# Patient Record
Sex: MALE | Race: WHITE | NOT HISPANIC OR LATINO | Employment: FULL TIME | URBAN - METROPOLITAN AREA
[De-identification: names, ages, dates, MRNs, and addresses within clinical notes are randomized per-mention and may not be internally consistent; named-entity substitution may affect disease eponyms.]

---

## 2017-08-22 ENCOUNTER — OFFICE VISIT (OUTPATIENT)
Dept: FAMILY MEDICINE | Facility: CLINIC | Age: 27
End: 2017-08-22
Payer: COMMERCIAL

## 2017-08-22 VITALS
DIASTOLIC BLOOD PRESSURE: 86 MMHG | SYSTOLIC BLOOD PRESSURE: 130 MMHG | RESPIRATION RATE: 16 BRPM | TEMPERATURE: 97.1 F | BODY MASS INDEX: 34.72 KG/M2 | WEIGHT: 248 LBS | HEIGHT: 71 IN | HEART RATE: 70 BPM | OXYGEN SATURATION: 99 %

## 2017-08-22 DIAGNOSIS — Z00.00 ROUTINE GENERAL MEDICAL EXAMINATION AT A HEALTH CARE FACILITY: Primary | ICD-10-CM

## 2017-08-22 DIAGNOSIS — Z23 NEED FOR VACCINATION: ICD-10-CM

## 2017-08-22 LAB
CHOLEST SERPL-MCNC: 182 MG/DL
HDLC SERPL-MCNC: 50 MG/DL
LDLC SERPL CALC-MCNC: 105 MG/DL
NONHDLC SERPL-MCNC: 132 MG/DL
TRIGL SERPL-MCNC: 134 MG/DL

## 2017-08-22 PROCEDURE — 36415 COLL VENOUS BLD VENIPUNCTURE: CPT | Performed by: OBSTETRICS & GYNECOLOGY

## 2017-08-22 PROCEDURE — 80061 LIPID PANEL: CPT | Performed by: OBSTETRICS & GYNECOLOGY

## 2017-08-22 PROCEDURE — 90471 IMMUNIZATION ADMIN: CPT | Performed by: OBSTETRICS & GYNECOLOGY

## 2017-08-22 PROCEDURE — 99395 PREV VISIT EST AGE 18-39: CPT | Mod: 25 | Performed by: OBSTETRICS & GYNECOLOGY

## 2017-08-22 PROCEDURE — 90715 TDAP VACCINE 7 YRS/> IM: CPT | Performed by: OBSTETRICS & GYNECOLOGY

## 2017-08-22 ASSESSMENT — PAIN SCALES - GENERAL: PAINLEVEL: NO PAIN (0)

## 2017-08-22 NOTE — MR AVS SNAPSHOT
"              After Visit Summary   2017    Kelechi Winkler    MRN: [id number]           Patient Information     Date Of Birth          1990        Visit Information        Provider Department      2017 7:50 AM Kodak Martinez MD [facility]        Today's Diagnoses     Routine general medical examination at a health care facility    -  1    Need for vaccination           Follow-ups after your visit        Who to contact     If you have questions or need follow up information about today's clinic visit or your schedule please contact [facility] directly at [phone].  Normal or non-critical lab and imaging results will be communicated to you by [employer], letter or phone within 4 business days after the clinic has received the results. If you do not hear from us within 7 days, please contact the clinic through [employer] or phone. If you have a critical or abnormal lab result, we will notify you by phone as soon as possible.  Submit refill requests through [employer] or call your pharmacy and they will forward the refill request to us. Please allow 3 business days for your refill to be completed.          Additional Information About Your Visit        MyChart Information     [employer] lets you send messages to your doctor, view your test results, renew your prescriptions, schedule appointments and more. To sign up, go to [email or web address] . Click on \"Log in\" on the left side of the screen, which will take you to the Welcome page. Then click on \"Sign up Now\" on the right side of the page.     You will be asked to enter the access code listed below, as well as some personal information. Please follow the directions to create your username and password.     Your access code is: [email or web address]  Expires: 2017  8:17 AM     Your access code will  in 90 days. If you need help or a new code, please call your [facility] or [phone].        Care " "EveryWhere ID     This is your Care EveryWhere ID. This could be used by other organizations to access your [place] medical records  [id number]        Your Vitals Were     Pulse Temperature Respirations Height Pulse Oximetry BMI (Body Mass Index)    70 97.1  F (36.2  C) (Tympanic) 16 5' 11\" (1.803 m) 99% 34.59 kg/m2       Blood Pressure from Last 3 Encounters:   08/22/17 130/86   09/08/16 114/60   04/14/10 110/70    Weight from Last 3 Encounters:   08/22/17 248 lb (112.5 kg)   09/08/16 238 lb 8 oz (108.2 kg)   04/14/10 203 lb 12.8 oz (92.4 kg)              We Performed the Following     1st  Administration  [77521]     Lipid panel reflex to direct LDL     TDAP VACCINE (ADACEL) [85568.002]        Primary Care Provider Office Phone # Fax #    Carl Lawrence PA-C [phone]       [address] [place] [address]        Equal Access to Services     [facility]: Hadii aad ku hadasho Soomaali, waaxda luqadaha, qaybta kaalmada adeegyada, taiwo vargas . So [facility] [phone].    ATENCIÓN: Si habla español, tiene a johnston disposición servicios gratuitos de asistencia lingüística. Llame al [phone].    We comply with applicable federal civil rights laws and [place] laws. We do not discriminate on the basis of race, color, national origin, age, disability sex, sexual orientation or gender identity.            Thank you!     Thank you for choosing [facility]  for your care. Our goal is always to provide you with excellent care. Hearing back from our patients is one way we can continue to improve our services. Please take a few minutes to complete the written survey that you may receive in the mail after your visit with us. Thank you!             Your Updated Medication List - Protect others around you: Learn how to safely use, store and throw away your medicines at www.disposemymeds.org.      Notice  As of 8/22/2017  8:17 AM    You have not been prescribed any " medications.

## 2017-08-22 NOTE — LETTER
Kelechi Winkler  [address] [place] [id number]        August 22, 2017          Dear ,    We are writing to inform you of your test results.    Your test results fall within the expected range(s).    Resulted Orders   Lipid panel reflex to direct LDL   Result Value Ref Range    Cholesterol 182 <200 mg/dL    Triglycerides 134 <150 mg/dL    HDL Cholesterol 50 >39 mg/dL    LDL Cholesterol Calculated 105 (H) <100 mg/dL      Comment:      Above desirable:  100-129 mg/dl  Borderline High:  130-159 mg/dL  High:             160-189 mg/dL  Very high:       >189 mg/dl      Non HDL Cholesterol 132 (H) <130 mg/dL      Comment:      Above Desirable:  130-159 mg/dl  Borderline high:  160-189 mg/dl  High:             190-219 mg/dl  Very high:       >219 mg/dl         If you have any questions or concerns, please call the clinic at the number listed above.       Sincerely,        Kodak Martinez MD

## 2017-08-22 NOTE — NURSING NOTE
"Chief Complaint   Patient presents with     Physical       Initial /86 (BP Location: Right arm, Patient Position: Chair, Cuff Size: Adult Large)  Pulse 70  Temp 97.1  F (36.2  C) (Tympanic)  Resp 16  Ht 5' 11\" (1.803 m)  Wt 248 lb (112.5 kg)  SpO2 99%  BMI 34.59 kg/m2 Estimated body mass index is 34.59 kg/(m^2) as calculated from the following:    Height as of this encounter: 5' 11\" (1.803 m).    Weight as of this encounter: 248 lb (112.5 kg)..   BP completed using cuff size: large  Medication Rec Completed    Johnna Kaplan CMA  Prior to injection verified patient identity using patient's name and date of birth.  Per orders of Dr. Martinez, injection of TDaP given by Johnna Kaplan. Patient instructed to remain in clinic for 15 minutes afterwards, and to report any adverse reaction to me immediately.    "

## 2017-08-22 NOTE — PROGRESS NOTES
Johnna Please inform Kelechi/ or caretaker  that this result(s) is/are normal.  Thanks. ANNA Martinez MD

## 2017-08-22 NOTE — PROGRESS NOTES
"Subjective:Kelechi is here for yearly physical. Current concerns are:  He has an oily scalp. Also he has had a lump in his neck on the left side for  Along time  ? 2 + Years- hasnt changed in size. nontender currently but sometimes gets tender-.    No past medical history on file.   Current Outpatient Prescriptions   Medication Sig Dispense Refill     IBUPROFEN 800 MG OR TABS 1 tab po 3 times daily with food 90 Tab 1      Allergies   Allergen Reactions     No Known Drug Allergies       History   Smoking Status     Passive Smoke Exposure - Never Smoker   Smokeless Tobacco     Not on file     Comment: second hand smoke, smokers are outside      Past Surgical History:   Procedure Laterality Date     C REPAIR CRUCIATE LIGAMENT,KNEE  10/19/2006    Bone grafting tibia & proximal tibia patella,  AC reconstruction.  [facility]      Social History   Substance Use Topics     Smoking status: Passive Smoke Exposure - Never Smoker     Smokeless tobacco: Not on file      Comment: second hand smoke, smokers are outside     Alcohol use No      Family History   Problem Relation Age of Onset     CANCER Maternal Grandfather      spine     CEREBROVASCULAR DISEASE Maternal Grandfather      Alcohol/Drug Paternal Grandmother      CANCER Paternal Grandfather      bladder       Review Of Systems  Skin: negative  Eyes: negative  Ears/Nose/Throat: negative  Respiratory: No shortness of breath, dyspnea on exertion, cough, or hemoptysis  Cardiovascular: negative  Gastrointestinal: negative  Genitourinary: negative  Musculoskeletal: negative  Neurologic: negative  Psychiatric: negative  Hematologic/Lymphatic/Immunologic: negative  Endocrine: negative      Physical Exam: vital signs: Blood pressure 130/86, pulse 70, temperature 97.1  F (36.2  C), temperature source Tympanic, resp. rate 16, height 5' 11\" (1.803 m), weight 248 lb (112.5 kg), SpO2 99 %.        HEENT is normal,   Sclerae and conjunctiva are normal. Ear canals and TMs look " normal.  Nasal mucosa is pink and no polyps or masses seen. Throat is unremarkable . Mucous membranes are moist.    Neck is supple, mobile, no adenoapthy or masses palpable except for one small mobile 1 cm lump on the left anterior cervical LN chain which I think is probably a small LN-it is not inflamed.. Normal range of motion noted.    I checked axillary and inguinal areas and there are no LN palpable in any of these areas.    Chest is clear to auscultation. No wheezes, rales or rhonchi heard. cardiac exam is normal with s1, s2, no murmurs or adventitious sounds.Normal rate and rhythm is heard.  Abdomen is soft,  nondistended, No masses felt.No HSM. No guarding or rigidity or rebound noted. Nontender   to palpation. Normal bowel sounds heard.   Extremities have normal sensation and color.Normal pulses noted.  No cyanosis or ulcers or trophic skin changes. No edema noted.    Genital Exam: testicles in scrotum- no masses or abnormalities noted. Penis circumcised.   no penile lesions noted.   Rectal exam: deferred    Assessment:1.Normal yearly exam    2.he has oily skin on his scalp- I advised a gentle shampoo such as omari's baby shampoo/   Avoid wearing his hat so much in the hot weather  3.I advised him to report to me or any physician if that lump in his neck changes size or if he gets any more lumps anywhere- and I advised a rechekc of that lump in 3-6 months-sooner if it gets larger-    Plan:Diet and rest and exercise discussed.   See labs and orders.Immunizations reviewed and discussed-including advice for yearly flu shot/tetanus update    Immunizations given today:  TDAP           I advised the following exams with specialists:    1. Dental evaluation yearly    2. Dermatology evaluation for mole exam yearly    3. Ophthalmology exam yearly to check for glaucoma, etc          Living will discussed:        Labs done:lipids    Followup in  3 months, sooner if concerns arise.   ANNA Martinez MD(electronic  signature)

## 2018-01-29 ENCOUNTER — OFFICE VISIT (OUTPATIENT)
Dept: FAMILY MEDICINE | Facility: OTHER | Age: 28
End: 2018-01-29
Payer: COMMERCIAL

## 2018-01-29 DIAGNOSIS — L03.319 CELLULITIS AND ABSCESS OF TRUNK: Primary | ICD-10-CM

## 2018-01-29 DIAGNOSIS — L02.219 CELLULITIS AND ABSCESS OF TRUNK: Primary | ICD-10-CM

## 2018-01-29 PROCEDURE — 99213 OFFICE O/P EST LOW 20 MIN: CPT | Performed by: NURSE PRACTITIONER

## 2018-01-29 RX ORDER — CEPHALEXIN 500 MG/1
500 CAPSULE ORAL 3 TIMES DAILY
Qty: 30 CAPSULE | Refills: 0 | Status: SHIPPED | OUTPATIENT
Start: 2018-01-29 | End: 2018-04-04

## 2018-01-29 NOTE — PROGRESS NOTES
SUBJECTIVE:   Kelechi Winkler is a 27 year old male who presents to clinic today for the following health issues:      POSSIBLE CYST  -right side lower abdomen  -x1-2 months  -no pain    Waxes and wanes.  Drains at times, but never completely goes away.  No fevers/chills.     Problem list and histories reviewed & adjusted, as indicated.  Additional history: none    Patient Active Problem List   Diagnosis     Internal derangement of knee     Injury, other and unspecified, knee, leg, ankle, and foot     Shoulder sprain     Past Surgical History:   Procedure Laterality Date     C REPAIR CRUCIATE LIGAMENT,KNEE  10/19/2006    Bone grafting tibia & proximal tibia patella,  AC reconstruction.  [facility]       Social History   Substance Use Topics     Smoking status: Passive Smoke Exposure - Never Smoker     Smokeless tobacco: Never Used      Comment: second hand smoke, smokers are outside     Alcohol use Yes      Comment: occasional     Family History   Problem Relation Age of Onset     CANCER Maternal Grandfather      spine     CEREBROVASCULAR DISEASE Maternal Grandfather      Alcohol/Drug Paternal Grandmother      CANCER Paternal Grandfather      bladder         Current Outpatient Prescriptions   Medication Sig Dispense Refill     cephALEXin (KEFLEX) 500 MG capsule Take 1 capsule (500 mg) by mouth 3 times daily 30 capsule 0     Allergies   Allergen Reactions     No Known Drug Allergies      BP Readings from Last 3 Encounters:   08/22/17 130/86   09/08/16 114/60   04/14/10 110/70    Wt Readings from Last 3 Encounters:   08/22/17 248 lb (112.5 kg)   09/08/16 238 lb 8 oz (108.2 kg)   04/14/10 203 lb 12.8 oz (92.4 kg)                    Reviewed and updated as needed this visit by clinical staff  Tobacco  Allergies  Meds  Soc Hx      Reviewed and updated as needed this visit by Provider         ROS:  Constitutional, HEENT, cardiovascular, pulmonary, gi and gu systems are negative, except as otherwise  noted.    OBJECTIVE:     There were no vitals taken for this visit.  There is no height or weight on file to calculate BMI.  GENERAL: healthy, alert and no distress  SKIN: right lower abdomen has raised, erythematous mass.  It is tender to palpation and with very little palpation, starts to drain purulent material.  There is what feels like scar tissue surrounding this.  Surrounding tissue erythema and warmth.     Diagnostic Test Results:  none     ASSESSMENT/PLAN:         1. Cellulitis and abscess of trunk  This is already draining, has surrounding tissue cellulitis, so will treat with antibiotics as prescribed. There is surrounding scar tissue verses cyst capsule, so will have him see general surgeon for possible removal.    - GENERAL SURG ADULT REFERRAL  - cephALEXin (KEFLEX) 500 MG capsule; Take 1 capsule (500 mg) by mouth 3 times daily  Dispense: 30 capsule; Refill: 0    See Patient Instructions    ALEXSANDRA Anne [facility]

## 2018-01-29 NOTE — PATIENT INSTRUCTIONS
Take Keflex as prescribed 3 times a day for 10 days.     See the general surgeon in [place] about getting this removed.

## 2018-01-29 NOTE — NURSING NOTE
"Chief Complaint   Patient presents with     Cyst     lower right abdomen       Initial There were no vitals taken for this visit. Estimated body mass index is 34.59 kg/(m^2) as calculated from the following:    Height as of 8/22/17: 5' 11\" (1.803 m).    Weight as of 8/22/17: 248 lb (112.5 kg).  Medication Reconciliation: complete   ................Thiago Lange LPN,   January 29, 2018,      3:48 PM,   [facility]    "

## 2018-01-29 NOTE — MR AVS SNAPSHOT
After Visit Summary   1/29/2018    Kelechi Winkler    MRN: [id number]           Patient Information     Date Of Birth          1990        Visit Information        Provider Department      1/29/2018 4:00 PM Rebeca Arambula APRN CNP [facility]        Today's Diagnoses     Cellulitis and abscess of trunk    -  1      Care Instructions    Take Keflex as prescribed 3 times a day for 10 days.     See the general surgeon in [place] about getting this removed.               Follow-ups after your visit        Additional Services     GENERAL SURG ADULT REFERRAL       Your provider has referred you to: [facility]: [facility] [phone]   [email or web address]    Please be aware that coverage of these services is subject to the terms and limitations of your health insurance plan.  Call member services at your health plan with any benefit or coverage questions.      Please bring the following with you to your appointment:    (1) Any X-Rays, CTs or MRIs which have been performed.  Contact the facility where they were done to arrange for  prior to your scheduled appointment.   (2) List of current medications   (3) This referral request   (4) Any documents/labs given to you for this referral                  Your next 10 appointments already scheduled     Jan 29, 2018  4:00 PM CST   Office Visit with ALEXSANDRA Anne CNP   [facility] ([facility])    [address] [facility] [address] [phone]           Bring a current list of meds and any records pertaining to this visit. For Physicals, please bring immunization records and any forms needing to be filled out. Please arrive 10 minutes early to complete paperwork.              Who to contact     If you have questions or need follow up information about today's clinic visit or your schedule please contact [facility] directly at  "[phone].  Normal or non-critical lab and imaging results will be communicated to you by [employer], letter or phone within 4 business days after the clinic has received the results. If you do not hear from us within 7 days, please contact the clinic through [employer] or phone. If you have a critical or abnormal lab result, we will notify you by phone as soon as possible.  Submit refill requests through [employer] or call your pharmacy and they will forward the refill request to us. Please allow 3 business days for your refill to be completed.          Additional Information About Your Visit        [employer] Information     [employer] lets you send messages to your doctor, view your test results, renew your prescriptions, schedule appointments and more. To sign up, go to [email or web address] . Click on \"Log in\" on the left side of the screen, which will take you to the Welcome page. Then click on \"Sign up Now\" on the right side of the page.     You will be asked to enter the access code listed below, as well as some personal information. Please follow the directions to create your username and password.     Your access code is: [email or web address]  Expires: 2018  3:58 PM     Your access code will  in 90 days. If you need help or a new code, please call your [place] clinic or [phone].        Care EveryWhere ID     This is your Care EveryWhere ID. This could be used by other organizations to access your [place] medical records  [id number]         Blood Pressure from Last 3 Encounters:   17 130/86   16 114/60   04/14/10 110/70    Weight from Last 3 Encounters:   17 248 lb (112.5 kg)   16 238 lb 8 oz (108.2 kg)   04/14/10 203 lb 12.8 oz (92.4 kg)              We Performed the Following     GENERAL SURG ADULT REFERRAL          Today's Medication Changes          These changes are accurate as of 18  3:58 PM.  If you have any questions, ask your nurse or doctor.               Start " taking these medicines.        Dose/Directions    cephALEXin 500 MG capsule   Commonly known as:  KEFLEX   Used for:  Cellulitis and abscess of trunk   Started by:  Rebeca Arambula APRN CNP        Dose:  500 mg   Take 1 capsule (500 mg) by mouth 3 times daily   Quantity:  30 capsule   Refills:  0            Where to get your medicines      These medications were sent to [facility] - [place], [place] - [address]  [address] , [facility] [address]     Phone:  [phone]     cephALEXin 500 MG capsule                Primary Care Provider Office Phone # Fax #    Carl Lawrence PA-C [phone]       [address] [address]        Equal Access to Services     [facility] : Hadii tessa price hadasho Sosashaali, waaxda luqadaha, qaybta kaalmada adeegyada, taiwo wolf. So [facility] [phone].    ATENCIÓN: Si habla español, tiene a johnston disposición servicios gratuitos de asistencia lingüística. [facility] [phone].    We comply with applicable federal civil rights laws and [place] laws. We do not discriminate on the basis of race, color, national origin, age, disability, sex, sexual orientation, or gender identity.            Thank you!     Thank you for choosing [facility]  for your care. Our goal is always to provide you with excellent care. Hearing back from our patients is one way we can continue to improve our services. Please take a few minutes to complete the written survey that you may receive in the mail after your visit with us. Thank you!             Your Updated Medication List - Protect others around you: Learn how to safely use, store and throw away your medicines at www.disposemymeds.org.          This list is accurate as of 1/29/18  3:58 PM.  Always use your most recent med list.                   Brand Name Dispense Instructions for use Diagnosis    cephALEXin 500 MG capsule    KEFLEX    30 capsule    Take 1  capsule (500 mg) by mouth 3 times daily    Cellulitis and abscess of trunk

## 2018-04-04 ENCOUNTER — OFFICE VISIT (OUTPATIENT)
Dept: SURGERY | Facility: CLINIC | Age: 28
End: 2018-04-04
Payer: COMMERCIAL

## 2018-04-04 VITALS
DIASTOLIC BLOOD PRESSURE: 70 MMHG | BODY MASS INDEX: 34.44 KG/M2 | SYSTOLIC BLOOD PRESSURE: 104 MMHG | TEMPERATURE: 97 F | HEIGHT: 71 IN | WEIGHT: 246 LBS

## 2018-04-04 DIAGNOSIS — L72.3 INFECTED SEBACEOUS CYST OF SKIN: Primary | ICD-10-CM

## 2018-04-04 DIAGNOSIS — L08.9 INFECTED SEBACEOUS CYST OF SKIN: Primary | ICD-10-CM

## 2018-04-04 PROCEDURE — 10060 I&D ABSCESS SIMPLE/SINGLE: CPT | Performed by: SURGERY

## 2018-04-04 PROCEDURE — 99243 OFF/OP CNSLTJ NEW/EST LOW 30: CPT | Mod: 25 | Performed by: SURGERY

## 2018-04-04 RX ORDER — SULFAMETHOXAZOLE/TRIMETHOPRIM 800-160 MG
1 TABLET ORAL 2 TIMES DAILY
Qty: 14 TABLET | Refills: 0 | Status: SHIPPED | OUTPATIENT
Start: 2018-04-04 | End: 2018-05-07

## 2018-04-04 NOTE — NURSING NOTE
"Chief Complaint   Patient presents with     Consult     cyst on belt line, referred by Rebeca Arambula APRN CNP        Initial /70  Temp 97  F (36.1  C) (Temporal)  Ht 1.803 m (5' 11\")  Wt 111.6 kg (246 lb)  BMI 34.31 kg/m2 Estimated body mass index is 34.31 kg/(m^2) as calculated from the following:    Height as of this encounter: 1.803 m (5' 11\").    Weight as of this encounter: 111.6 kg (246 lb).  Medication Reconciliation: complete   Kee REESE CMA    "

## 2018-04-04 NOTE — PROGRESS NOTES
Patient seen in consultation for recurrent infected sebaceous cyst by Rebeca Arambula    HPI:  Patient is a 27 year old male with several month history of RLQ skin infection. He saw a provider in January with an infected spot that was already spontaneously draining at the time. He was given keflex and told to follow up with surgery. He didn't follow up at that time. It is now enlarging and becoming more painful again. It has not drained this time. No fevers, chills, rigors. No issues urinating or stooling.     Review Of Systems    Skin: as above  Ears/Nose/Throat: negative  Respiratory: No shortness of breath, dyspnea on exertion, cough, or hemoptysis  Cardiovascular: negative  Gastrointestinal: negative  Genitourinary: negative  Musculoskeletal: negative  Neurologic: negative  Hematologic/Lymphatic/Immunologic: negative  Endocrine: negative      No past medical history on file.    Past Surgical History:   Procedure Laterality Date     C REPAIR CRUCIATE LIGAMENT,KNEE  10/19/2006    Bone grafting tibia & proximal tibia patella,  AC reconstruction.  [facility]       Family History   Problem Relation Age of Onset     CANCER Maternal Grandfather      spine     CEREBROVASCULAR DISEASE Maternal Grandfather      Alcohol/Drug Paternal Grandmother      CANCER Paternal Grandfather      bladder       Social History     Social History     Marital status: Single     Spouse name: N/A     Number of children: N/A     Years of education: N/A     Occupational History     Not on file.     Social History Main Topics     Smoking status: Passive Smoke Exposure - Never Smoker     Smokeless tobacco: Never Used      Comment: second hand smoke, smokers are outside     Alcohol use Yes      Comment: occasional     Drug use: No     Sexual activity: Yes     Partners: Female     Other Topics Concern      Service No     Blood Transfusions No     Caffeine Concern No     Occupational Exposure No     Hobby Hazards No     Sleep Concern No  "    Stress Concern No     Weight Concern No     Special Diet No     Back Care No     Exercise Yes     Bike Helmet Yes     Seat Belt Yes     Social History Narrative       Current Outpatient Prescriptions   Medication Sig Dispense Refill     sulfamethoxazole-trimethoprim (BACTRIM DS/SEPTRA DS) 800-160 MG per tablet Take 1 tablet by mouth 2 times daily 14 tablet 0       Medications and history reviewed    Physical exam:  Vitals: /70  Temp 97  F (36.1  C) (Temporal)  Ht 1.803 m (5' 11\")  Wt 111.6 kg (246 lb)  BMI 34.31 kg/m2  BMI= Body mass index is 34.31 kg/(m^2).    Constitutional: Healthy, alert, non-distressed   Head: Normo-cephalic, atraumatic, no lesions, masses or tenderness   Cardiovascular: RRR, no new murmurs, +S1, +S2 heart sounds, no clicks, rubs or gallops   Respiratory: CTAB, no rales, rhonchi or wheezing, equal chest rise, good respiratory effort   Gastrointestinal: Soft, non-tender, non distended, no rebound rigidity or guarding, no masses or hernias palpated   : Deferred  Musculoskeletal: Moves all extremities, normal  strength, no deformities noted   Skin: RLQ macie of induration with central fluctuance. Previous site of drainage noted but not currently draining, erythema surrounds the area of induration  Psychiatric: Mentation appears normal, affect appropriate   Hematologic/Lymphatic/Immunologic: Normal cervical and supraclavicular lymph nodes   Patient able to get up on table without difficulty.    Labs show:  No results found for this or any previous visit (from the past 24 hour(s)).      Assessment:     ICD-10-CM    1. Infected sebaceous cyst of skin L72.3 sulfamethoxazole-trimethoprim (BACTRIM DS/SEPTRA DS) 800-160 MG per tablet    L08.9      Plan: I&D of the infection today with PO ABX for 7 days. He is to return in 2 weeks for local excision of cyst versus recurrent abscess cavity. See below for procedure. He was instructed to remove packing in 24 hours and keep dry gauze over " "wound until heals over.    Fernando Marcus, DO     PROCEDURE:   Written consent was obtained    The RLQ area was prepped and appropriately anesthetized with 1% lidocaine with epinephrine. Using the usual technique, incision and drainage was performed. 5ml purulence drained. 1/4\" plain packing placed. An appropriate  dressing was applied.  The procedure was well tolerated and without complications. Specimen was not sent to Pathology.        "

## 2018-04-04 NOTE — LETTER
4/4/2018         RE: Kelechi Winkler  [address] [place] [id number]        Dear Colleague,    Thank you for referring your patient, Kelechi Winkler, to the [facility]. Please see a copy of my visit note below.    Patient seen in consultation for recurrent infected sebaceous cyst by Rebeca Arambula    HPI:  Patient is a 27 year old male with several month history of RLQ skin infection. He saw a provider in January with an infected spot that was already spontaneously draining at the time. He was given keflex and told to follow up with surgery. He didn't follow up at that time. It is now enlarging and becoming more painful again. It has not drained this time. No fevers, chills, rigors. No issues urinating or stooling.     Review Of Systems    Skin: as above  Ears/Nose/Throat: negative  Respiratory: No shortness of breath, dyspnea on exertion, cough, or hemoptysis  Cardiovascular: negative  Gastrointestinal: negative  Genitourinary: negative  Musculoskeletal: negative  Neurologic: negative  Hematologic/Lymphatic/Immunologic: negative  Endocrine: negative      No past medical history on file.    Past Surgical History:   Procedure Laterality Date     C REPAIR CRUCIATE LIGAMENT,KNEE  10/19/2006    Bone grafting tibia & proximal tibia patella,  AC reconstruction.  [facility]       Family History   Problem Relation Age of Onset     CANCER Maternal Grandfather      spine     CEREBROVASCULAR DISEASE Maternal Grandfather      Alcohol/Drug Paternal Grandmother      CANCER Paternal Grandfather      bladder       Social History     Social History     Marital status: Single     Spouse name: N/A     Number of children: N/A     Years of education: N/A     Occupational History     Not on file.     Social History Main Topics     Smoking status: Passive Smoke Exposure - Never Smoker     Smokeless tobacco: Never Used      Comment: second hand smoke, smokers are outside     Alcohol use Yes       "Comment: occasional     Drug use: No     Sexual activity: Yes     Partners: Female     Other Topics Concern      Service No     Blood Transfusions No     Caffeine Concern No     Occupational Exposure No     Hobby Hazards No     Sleep Concern No     Stress Concern No     Weight Concern No     Special Diet No     Back Care No     Exercise Yes     Bike Helmet Yes     Seat Belt Yes     Social History Narrative       Current Outpatient Prescriptions   Medication Sig Dispense Refill     sulfamethoxazole-trimethoprim (BACTRIM DS/SEPTRA DS) 800-160 MG per tablet Take 1 tablet by mouth 2 times daily 14 tablet 0       Medications and history reviewed    Physical exam:  Vitals: /70  Temp 97  F (36.1  C) (Temporal)  Ht 1.803 m (5' 11\")  Wt 111.6 kg (246 lb)  BMI 34.31 kg/m2  BMI= Body mass index is 34.31 kg/(m^2).    Constitutional: Healthy, alert, non-distressed   Head: Normo-cephalic, atraumatic, no lesions, masses or tenderness   Cardiovascular: RRR, no new murmurs, +S1, +S2 heart sounds, no clicks, rubs or gallops   Respiratory: CTAB, no rales, rhonchi or wheezing, equal chest rise, good respiratory effort   Gastrointestinal: Soft, non-tender, non distended, no rebound rigidity or guarding, no masses or hernias palpated   : Deferred  Musculoskeletal: Moves all extremities, normal  strength, no deformities noted   Skin: RLQ macie of induration with central fluctuance. Previous site of drainage noted but not currently draining, erythema surrounds the area of induration  Psychiatric: Mentation appears normal, affect appropriate   Hematologic/Lymphatic/Immunologic: Normal cervical and supraclavicular lymph nodes   Patient able to get up on table without difficulty.    Labs show:  No results found for this or any previous visit (from the past 24 hour(s)).      Assessment:     ICD-10-CM    1. Infected sebaceous cyst of skin L72.3 sulfamethoxazole-trimethoprim (BACTRIM DS/SEPTRA DS) 800-160 MG per tablet    " "L08.9      Plan: I&D of the infection today with PO ABX for 7 days. He is to return in 2 weeks for local excision of cyst versus recurrent abscess cavity. See below for procedure. He was instructed to remove packing in 24 hours and keep dry gauze over wound until heals over.    Fernando Marcus DO     PROCEDURE:   Written consent was obtained    The RLQ area was prepped and appropriately anesthetized with 1% lidocaine with epinephrine. Using the usual technique, incision and drainage was performed. 5ml purulence drained. 1/4\" plain packing placed. An appropriate  dressing was applied.  The procedure was well tolerated and without complications. Specimen was not sent to Pathology.          Again, thank you for allowing me to participate in the care of your patient.        Sincerely,        Fernando Marcus, DO    "

## 2018-05-07 ENCOUNTER — RADIANT APPOINTMENT (OUTPATIENT)
Dept: GENERAL RADIOLOGY | Facility: OTHER | Age: 28
End: 2018-05-07
Attending: PHYSICIAN ASSISTANT
Payer: COMMERCIAL

## 2018-05-07 ENCOUNTER — OFFICE VISIT (OUTPATIENT)
Dept: FAMILY MEDICINE | Facility: OTHER | Age: 28
End: 2018-05-07
Payer: OTHER MISCELLANEOUS

## 2018-05-07 VITALS
SYSTOLIC BLOOD PRESSURE: 108 MMHG | TEMPERATURE: 98.3 F | WEIGHT: 255 LBS | BODY MASS INDEX: 35.57 KG/M2 | DIASTOLIC BLOOD PRESSURE: 66 MMHG | HEART RATE: 84 BPM

## 2018-05-07 DIAGNOSIS — S93.601A FOOT SPRAIN, RIGHT, INITIAL ENCOUNTER: ICD-10-CM

## 2018-05-07 DIAGNOSIS — M79.671 RIGHT FOOT PAIN: Primary | ICD-10-CM

## 2018-05-07 DIAGNOSIS — M79.671 RIGHT FOOT PAIN: ICD-10-CM

## 2018-05-07 PROCEDURE — 73630 X-RAY EXAM OF FOOT: CPT | Mod: RT

## 2018-05-07 PROCEDURE — 99213 OFFICE O/P EST LOW 20 MIN: CPT | Performed by: PHYSICIAN ASSISTANT

## 2018-05-07 ASSESSMENT — PAIN SCALES - GENERAL: PAINLEVEL: MODERATE PAIN (4)
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
## 2018-05-07 NOTE — MR AVS SNAPSHOT
"              After Visit Summary   2018    Kelechi Winkler    MRN: [id number]           Patient Information     Date Of Birth          1990        Visit Information        Provider Department      2018 3:30 PM Laura Rick PA-C [facility]        Today's Diagnoses     Right foot pain    -  1    Foot sprain, right, initial encounter           Follow-ups after your visit        Who to contact     If you have questions or need follow up information about today's clinic visit or your schedule please contact [facility] directly at [phone].  Normal or non-critical lab and imaging results will be communicated to you by PopUpstershart, letter or phone within 4 business days after the clinic has received the results. If you do not hear from us within 7 days, please contact the clinic through PopUpstershart or phone. If you have a critical or abnormal lab result, we will notify you by phone as soon as possible.  Submit refill requests through [employer] or call your pharmacy and they will forward the refill request to us. Please allow 3 business days for your refill to be completed.          Additional Information About Your Visit        MyChart Information     [employer] lets you send messages to your doctor, view your test results, renew your prescriptions, schedule appointments and more. To sign up, go to [email or web address] . Click on \"Log in\" on the left side of the screen, which will take you to the Welcome page. Then click on \"Sign up Now\" on the right side of the page.     You will be asked to enter the access code listed below, as well as some personal information. Please follow the directions to create your username and password.     Your access code is: [email or web address]  Expires: 2018  3:43 PM     Your access code will  in 90 days. If you need help or a new code, please call your [facility] or [phone].        Care EveryWhere ID     This is your Care EveryWhere " ID. This could be used by other organizations to access your [place] medical records  [id number]        Your Vitals Were     Pulse Temperature BMI (Body Mass Index)             84 98.3  F (36.8  C) 35.57 kg/m2          Blood Pressure from Last 3 Encounters:   05/07/18 108/66   04/04/18 104/70   08/22/17 130/86    Weight from Last 3 Encounters:   05/07/18 255 lb (115.7 kg)   04/04/18 246 lb (111.6 kg)   08/22/17 248 lb (112.5 kg)               Primary Care Provider Office Phone # Fax #    Carl Lawrence PA-C [phone] [address] [facility] [address]        Equal Access to Services     TRENTON CONNELL : Cheli montero Soabrahan, waaxda luqadaha, qaybta kaalmada adetraceeyamelissa, taiwo wolf. So [facility] [phone].    ATENCIÓN: Si habla español, tiene a johnston disposición servicios gratuitos de asistencia lingüística. Llame al [phone].    We comply with applicable federal civil rights laws and [place] laws. We do not discriminate on the basis of race, color, national origin, age, disability, sex, sexual orientation, or gender identity.            Thank you!     Thank you for choosing [facility]  for your care. Our goal is always to provide you with excellent care. Hearing back from our patients is one way we can continue to improve our services. Please take a few minutes to complete the written survey that you may receive in the mail after your visit with us. Thank you!             Your Updated Medication List - Protect others around you: Learn how to safely use, store and throw away your medicines at www.disposemymeds.org.      Notice  As of 5/7/2018  3:47 PM    You have not been prescribed any medications.

## 2018-09-10 ENCOUNTER — ALLIED HEALTH/NURSE VISIT (OUTPATIENT)
Dept: FAMILY MEDICINE | Facility: CLINIC | Age: 28
End: 2018-09-10
Payer: COMMERCIAL

## 2018-09-10 DIAGNOSIS — Z23 NEED FOR PROPHYLACTIC VACCINATION AND INOCULATION AGAINST INFLUENZA: Primary | ICD-10-CM

## 2018-09-10 PROCEDURE — 90686 IIV4 VACC NO PRSV 0.5 ML IM: CPT

## 2018-09-10 PROCEDURE — 90471 IMMUNIZATION ADMIN: CPT

## 2018-09-10 NOTE — PROGRESS NOTES

## 2018-09-10 NOTE — NURSING NOTE
Prior to injection verified patient identity using patient's name and date of birth.  Due to injection administration, patient instructed to remain in clinic for 15 minutes  afterwards, and to report any adverse reaction to me immediately.    Nancy Mathews, CMA

## 2018-09-10 NOTE — MR AVS SNAPSHOT
After Visit Summary   9/10/2018    Kelechi Winkler    MRN: [id number]           Patient Information     Date Of Birth          1990        Visit Information        Provider Department      9/10/2018 4:45 PM NOREEN PITT MA [facility]        Today's Diagnoses     Need for prophylactic vaccination and inoculation against influenza    -  1       Follow-ups after your visit        Who to contact     If you have questions or need follow up information about today's clinic visit or your schedule please contact [facility] directly at [phone].  Normal or non-critical lab and imaging results will be communicated to you by MyChart, letter or phone within 4 business days after the clinic has received the results. If you do not hear from us within 7 days, please contact the clinic through MyChart or phone. If you have a critical or abnormal lab result, we will notify you by phone as soon as possible.  Submit refill requests through [employer] or call your pharmacy and they will forward the refill request to us. Please allow 3 business days for your refill to be completed.          Additional Information About Your Visit        Care EveryWhere ID     This is your Care EveryWhere ID. This could be used by other organizations to access your [place] medical records  [id number]         Blood Pressure from Last 3 Encounters:   05/07/18 108/66   04/04/18 104/70   08/22/17 130/86    Weight from Last 3 Encounters:   05/07/18 255 lb (115.7 kg)   04/04/18 246 lb (111.6 kg)   08/22/17 248 lb (112.5 kg)              We Performed the Following     FLU VACCINE, SPLIT VIRUS, IM (QUADRIVALENT) [76382]- >3 YRS     Vaccine Administration, Initial [60463]        Primary Care Provider Office Phone # Fax #    Carl Lawrence PA-C [phone] [address] [facility] [address]        Equal Access to Services     TRENTON CONNELL AH: lan Poon,  taiwo nevarez lesleyedvin mckeetracee vargas ah. So [facility] [phone].    ATENCIÓN: Si habla kia, tiene a johnston disposición servicios gratuitos de asistencia lingüística. Llame al [phone].    We comply with applicable federal civil rights laws and [place] laws. We do not discriminate on the basis of race, color, national origin, age, disability, sex, sexual orientation, or gender identity.            Thank you!     Thank you for choosing [facility]  for your care. Our goal is always to provide you with excellent care. Hearing back from our patients is one way we can continue to improve our services. Please take a few minutes to complete the written survey that you may receive in the mail after your visit with us. Thank you!             Your Updated Medication List - Protect others around you: Learn how to safely use, store and throw away your medicines at www.disposemymeds.org.      Notice  As of 9/10/2018  5:53 PM    You have not been prescribed any medications.

## 2022-06-02 NOTE — MR AVS SNAPSHOT
"              After Visit Summary   2018    Kelechi Winkler    MRN: [id number]           Patient Information     Date Of Birth          1990        Visit Information        Provider Department      2018 8:00 AM Fernando Marcus, DO [facility]        Today's Diagnoses     Infected sebaceous cyst of skin    -  1       Follow-ups after your visit        Follow-up notes from your care team     Return in about 2 weeks (around 2018).      Who to contact     If you have questions or need follow up information about today's clinic visit or your schedule please contact [facility] directly at [phone].  Normal or non-critical lab and imaging results will be communicated to you by MyChart, letter or phone within 4 business days after the clinic has received the results. If you do not hear from us within 7 days, please contact the clinic through Seafilehart or phone. If you have a critical or abnormal lab result, we will notify you by phone as soon as possible.  Submit refill requests through [employer] or call your pharmacy and they will forward the refill request to us. Please allow 3 business days for your refill to be completed.          Additional Information About Your Visit        MyChart Information     [employer] lets you send messages to your doctor, view your test results, renew your prescriptions, schedule appointments and more. To sign up, go to [email or web address] . Click on \"Log in\" on the left side of the screen, which will take you to the Welcome page. Then click on \"Sign up Now\" on the right side of the page.     You will be asked to enter the access code listed below, as well as some personal information. Please follow the directions to create your username and password.     Your access code is: [email or web address]  Expires: 2018  4:58 PM     Your access code will  in 90 days. If you need help or a new code, please call your [facility] or " Impression: Other secondary cataract, bilateral: H26.493. Plan: Discussed diagnosis in detail with patient. Secondary cataracts affecting vision some, but no surgery is currently recommended. The patient will monitor vision changes and contact us with any decrease in vision. Will continue to monitor. "[phone].        Care EveryWhere ID     This is your Care EveryWhere ID. This could be used by other organizations to access your [place] medical records  [id number]        Your Vitals Were     Temperature Height BMI (Body Mass Index)             97  F (36.1  C) (Temporal) 1.803 m (5' 11\") 34.31 kg/m2          Blood Pressure from Last 3 Encounters:   04/04/18 104/70   08/22/17 130/86   09/08/16 114/60    Weight from Last 3 Encounters:   04/04/18 111.6 kg (246 lb)   08/22/17 112.5 kg (248 lb)   09/08/16 108.2 kg (238 lb 8 oz)              Today, you had the following     No orders found for display         Today's Medication Changes          These changes are accurate as of 4/4/18  8:55 AM.  If you have any questions, ask your nurse or doctor.               Start taking these medicines.        Dose/Directions    sulfamethoxazole-trimethoprim 800-160 MG per tablet   Commonly known as:  BACTRIM DS/SEPTRA DS   Used for:  Infected sebaceous cyst of skin   Started by:  Fernando Marcus,         Dose:  1 tablet   Take 1 tablet by mouth 2 times daily   Quantity:  14 tablet   Refills:  0            Where to get your medicines      These medications were sent to [facility] [address]   [address] [facility] [address]     Phone:  [phone]     sulfamethoxazole-trimethoprim 800-160 MG per tablet                Primary Care Provider Office Phone # Fax #    Carl Lawrence PA-C [phone]       [address] [address]        Equal Access to Services     BETH CONNELL AH: Hadii tessa Key, waaxda luqadaha, qaybta kaalmataiwo naylor. So [facility] [phone].    ATENCIÓN: Si habla español, tiene a johnston disposición servicios gratuitos de asistencia lingüística. Kim barajas [phone].    We comply with applicable federal civil rights laws and [place] laws. We do not discriminate on the " basis of race, color, national origin, age, disability, sex, sexual orientation, or gender identity.            Thank you!     Thank you for choosing [facility]  for your care. Our goal is always to provide you with excellent care. Hearing back from our patients is one way we can continue to improve our services. Please take a few minutes to complete the written survey that you may receive in the mail after your visit with us. Thank you!             Your Updated Medication List - Protect others around you: Learn how to safely use, store and throw away your medicines at www.disposemymeds.org.          This list is accurate as of 4/4/18  8:55 AM.  Always use your most recent med list.                   Brand Name Dispense Instructions for use Diagnosis    sulfamethoxazole-trimethoprim 800-160 MG per tablet    BACTRIM DS/SEPTRA DS    14 tablet    Take 1 tablet by mouth 2 times daily    Infected sebaceous cyst of skin

## 2024-01-06 ENCOUNTER — HEALTH MAINTENANCE LETTER (OUTPATIENT)
Age: 34
End: 2024-01-06

## 2024-01-25 ENCOUNTER — OFFICE VISIT (OUTPATIENT)
Dept: INTERNAL MEDICINE | Facility: CLINIC | Age: 34
End: 2024-01-25
Payer: COMMERCIAL

## 2024-01-25 VITALS
TEMPERATURE: 98 F | OXYGEN SATURATION: 96 % | HEART RATE: 72 BPM | HEIGHT: 71 IN | DIASTOLIC BLOOD PRESSURE: 66 MMHG | WEIGHT: 258.5 LBS | SYSTOLIC BLOOD PRESSURE: 106 MMHG | BODY MASS INDEX: 36.19 KG/M2 | RESPIRATION RATE: 18 BRPM

## 2024-01-25 DIAGNOSIS — E78.5 HYPERLIPIDEMIA LDL GOAL <130: ICD-10-CM

## 2024-01-25 DIAGNOSIS — Z00.00 ROUTINE GENERAL MEDICAL EXAMINATION AT A HEALTH CARE FACILITY: Primary | ICD-10-CM

## 2024-01-25 DIAGNOSIS — Z11.59 NEED FOR HEPATITIS C SCREENING TEST: ICD-10-CM

## 2024-01-25 DIAGNOSIS — Z11.4 SCREENING FOR HIV (HUMAN IMMUNODEFICIENCY VIRUS): ICD-10-CM

## 2024-01-25 LAB
ALBUMIN SERPL BCG-MCNC: 4.8 G/DL (ref 3.5–5.2)
ALBUMIN UR-MCNC: NEGATIVE MG/DL
ALP SERPL-CCNC: 74 U/L (ref 40–150)
ALT SERPL W P-5'-P-CCNC: 23 U/L (ref 0–70)
ANION GAP SERPL CALCULATED.3IONS-SCNC: 10 MMOL/L (ref 7–15)
APPEARANCE UR: CLEAR
AST SERPL W P-5'-P-CCNC: 19 U/L (ref 0–45)
BILIRUB SERPL-MCNC: 0.6 MG/DL
BILIRUB UR QL STRIP: NEGATIVE
BUN SERPL-MCNC: 13.3 MG/DL (ref 6–20)
CALCIUM SERPL-MCNC: 9.8 MG/DL (ref 8.6–10)
CHLORIDE SERPL-SCNC: 103 MMOL/L (ref 98–107)
CHOLEST SERPL-MCNC: 202 MG/DL
COLOR UR AUTO: YELLOW
CREAT SERPL-MCNC: 1.22 MG/DL (ref 0.67–1.17)
DEPRECATED HCO3 PLAS-SCNC: 29 MMOL/L (ref 22–29)
EGFRCR SERPLBLD CKD-EPI 2021: 80 ML/MIN/1.73M2
ERYTHROCYTE [DISTWIDTH] IN BLOOD BY AUTOMATED COUNT: 12.7 % (ref 10–15)
FASTING STATUS PATIENT QL REPORTED: YES
GLUCOSE SERPL-MCNC: 100 MG/DL (ref 70–99)
GLUCOSE UR STRIP-MCNC: NEGATIVE MG/DL
HCT VFR BLD AUTO: 45.8 % (ref 40–53)
HCV AB SERPL QL IA: NONREACTIVE
HDLC SERPL-MCNC: 47 MG/DL
HGB BLD-MCNC: 15.6 G/DL (ref 13.3–17.7)
HGB UR QL STRIP: NEGATIVE
HIV 1+2 AB+HIV1 P24 AG SERPL QL IA: NONREACTIVE
KETONES UR STRIP-MCNC: NEGATIVE MG/DL
LDLC SERPL CALC-MCNC: 122 MG/DL
LEUKOCYTE ESTERASE UR QL STRIP: NEGATIVE
MCH RBC QN AUTO: 28.1 PG (ref 26.5–33)
MCHC RBC AUTO-ENTMCNC: 34.1 G/DL (ref 31.5–36.5)
MCV RBC AUTO: 82 FL (ref 78–100)
NITRATE UR QL: NEGATIVE
NONHDLC SERPL-MCNC: 155 MG/DL
PH UR STRIP: 5 [PH] (ref 5–7)
PLATELET # BLD AUTO: 241 10E3/UL (ref 150–450)
POTASSIUM SERPL-SCNC: 4.5 MMOL/L (ref 3.4–5.3)
PROT SERPL-MCNC: 7.8 G/DL (ref 6.4–8.3)
RBC # BLD AUTO: 5.56 10E6/UL (ref 4.4–5.9)
SODIUM SERPL-SCNC: 142 MMOL/L (ref 135–145)
SP GR UR STRIP: 1.02 (ref 1–1.03)
TRIGL SERPL-MCNC: 166 MG/DL
UROBILINOGEN UR STRIP-MCNC: NORMAL MG/DL
WBC # BLD AUTO: 5.8 10E3/UL (ref 4–11)

## 2024-01-25 PROCEDURE — 99385 PREV VISIT NEW AGE 18-39: CPT | Performed by: INTERNAL MEDICINE

## 2024-01-25 PROCEDURE — 81003 URINALYSIS AUTO W/O SCOPE: CPT | Performed by: INTERNAL MEDICINE

## 2024-01-25 PROCEDURE — 87389 HIV-1 AG W/HIV-1&-2 AB AG IA: CPT | Performed by: INTERNAL MEDICINE

## 2024-01-25 PROCEDURE — 86803 HEPATITIS C AB TEST: CPT | Performed by: INTERNAL MEDICINE

## 2024-01-25 PROCEDURE — 80061 LIPID PANEL: CPT | Performed by: INTERNAL MEDICINE

## 2024-01-25 PROCEDURE — 80053 COMPREHEN METABOLIC PANEL: CPT | Performed by: INTERNAL MEDICINE

## 2024-01-25 PROCEDURE — 36415 COLL VENOUS BLD VENIPUNCTURE: CPT | Performed by: INTERNAL MEDICINE

## 2024-01-25 PROCEDURE — 85027 COMPLETE CBC AUTOMATED: CPT | Performed by: INTERNAL MEDICINE

## 2024-01-25 ASSESSMENT — ENCOUNTER SYMPTOMS
PALPITATIONS: 0
CHILLS: 0
ARTHRALGIAS: 0
DYSURIA: 0
WEAKNESS: 0
COUGH: 0
HEMATOCHEZIA: 0
NERVOUS/ANXIOUS: 0
HEADACHES: 0
PARESTHESIAS: 0
FREQUENCY: 0
JOINT SWELLING: 0
SHORTNESS OF BREATH: 0
DIARRHEA: 0
CONSTIPATION: 0
EYE PAIN: 0
HEARTBURN: 0
ABDOMINAL PAIN: 0
FEVER: 0
MYALGIAS: 0
DIZZINESS: 0
SORE THROAT: 0
HEMATURIA: 0
NAUSEA: 0

## 2024-01-25 NOTE — PROGRESS NOTES
"Preventive Care Visit  [facility]  Ramiro Truong DO, Internal Medicine  Jan 25, 2024      SUBJECTIVE:   Kelechi is a 33 year old, presenting for the following:  Physical        1/25/2024     8:42 AM   Additional Questions   Roomed by Leatha ABDALLA MA     Healthy Habits:     Getting at least 3 servings of Calcium per day:  Yes    Bi-annual eye exam:  NO    Dental care twice a year:  NO    Sleep apnea or symptoms of sleep apnea:  None    Diet:  Regular (no restrictions)    Frequency of exercise:  4-5 days/week    Duration of exercise:  45-60 minutes    Medication side effects:  Not applicable    Additional concerns today:  No      Today's PHQ-2 Score:       1/25/2024     8:25 AM   PHQ-2 ( 1999 [employer])   Q1: Little interest or pleasure in doing things 0   Q2: Feeling down, depressed or hopeless 0   PHQ-2 Score 0   Q1: Little interest or pleasure in doing things Not at all   Q2: Feeling down, depressed or hopeless Not at all   PHQ-2 Score 0                   Have you ever done Advance Care Planning? (For example, a Health Directive, POLST, or a discussion with a medical provider or your loved ones about your wishes): No, advance care planning information given to patient to review.  Patient plans to discuss their wishes with loved ones or provider.      Social History     Tobacco Use    Smoking status: Never     Passive exposure: Yes    Smokeless tobacco: Never    Tobacco comments:     second hand smoke, smokers are outside   Substance Use Topics    Alcohol use: Not Currently     Comment: occasional             1/25/2024     8:24 AM   Alcohol Use   Prescreen: >3 drinks/day or >7 drinks/week? No       Last PSA: No results found for: \"PSA\"    Reviewed orders with patient. Reviewed health maintenance and updated orders accordingly - Yes  Lab work is in process  Labs reviewed in EPIC  BP Readings from Last 3 Encounters:   01/25/24 106/66   05/07/18 108/66   04/04/18 104/70    Wt " Readings from Last 3 Encounters:   01/25/24 117.3 kg (258 lb 8 oz)   05/07/18 115.7 kg (255 lb)   04/04/18 111.6 kg (246 lb)                  Patient Active Problem List   Diagnosis    Internal derangement of knee    Injury, other and unspecified, knee, leg, ankle, and foot    Shoulder sprain     Past Surgical History:   Procedure Laterality Date    C REPAIR CRUCIATE LIGAMENT,KNEE  10/19/2006    Bone grafting tibia & proximal tibia patella,  AC reconstruction.  [facility]       Social History     Tobacco Use    Smoking status: Never     Passive exposure: Yes    Smokeless tobacco: Never    Tobacco comments:     second hand smoke, smokers are outside   Substance Use Topics    Alcohol use: Not Currently     Comment: occasional     Family History   Problem Relation Age of Onset    Diabetes Father     Breast Cancer Maternal Grandmother     Cancer Maternal Grandfather         spine    Cerebrovascular Disease Maternal Grandfather     Alcohol/Drug Paternal Grandmother     Cancer Paternal Grandfather         bladder         No current outpatient medications on file.     Allergies   Allergen Reactions    No Known Drug Allergy        Reviewed and updated as needed this visit by clinical staff   Tobacco  Allergies  Meds              Reviewed and updated as needed this visit by Provider                  History reviewed. No pertinent past medical history.   Past Surgical History:   Procedure Laterality Date    C REPAIR CRUCIATE LIGAMENT,KNEE  10/19/2006    Bone grafting tibia & proximal tibia patella,  AC reconstruction.  [facility]     Review of Systems   Constitutional:  Negative for chills and fever.   HENT:  Negative for congestion, ear pain, hearing loss and sore throat.    Eyes:  Negative for pain and visual disturbance.   Respiratory:  Negative for cough and shortness of breath.    Cardiovascular:  Negative for chest pain and palpitations.   Gastrointestinal:  Negative for abdominal pain,  "constipation, diarrhea and nausea.   Genitourinary:  Negative for dysuria, frequency, genital sores, hematuria, penile discharge and urgency.   Musculoskeletal:  Negative for arthralgias, joint swelling and myalgias.   Skin:  Negative for rash.   Neurological:  Negative for dizziness, weakness and headaches.   Psychiatric/Behavioral:  The patient is not nervous/anxious.          OBJECTIVE:   There were no vitals taken for this visit.   Estimated body mass index is 35.57 kg/m  as calculated from the following:    Height as of 4/4/18: 1.803 m (5' 11\").    Weight as of 5/7/18: 115.7 kg (255 lb).  Physical Exam  GENERAL: alert and no distress  EYES: Eyes grossly normal to inspection, PERRL and conjunctivae and sclerae normal  HENT: ear canals and TM's normal, nose and mouth without ulcers or lesions  NECK: no adenopathy, no asymmetry, masses, or scars  RESP: lungs clear to auscultation - no rales, rhonchi or wheezes  CV: regular rate and rhythm, normal S1 S2, no S3 or S4, no murmur, click or rub, no peripheral edema  ABDOMEN: soft, nontender, no hepatosplenomegaly, no masses and bowel sounds normal  MS: no gross musculoskeletal defects noted, no edema  SKIN: no suspicious lesions or rashes  NEURO: Normal strength and tone, mentation intact and speech normal  PSYCH: mentation appears normal, affect normal/bright  LYMPH: no cervical, supraclavicular, axillary, or inguinal adenopathy    Diagnostic Test Results:  No results found for this or any previous visit (from the past 24 hour(s)).    ASSESSMENT/PLAN:   Routine general medical examination at a health care facility    - CBC with platelets; Future  - Comprehensive metabolic panel (BMP + Alb, Alk Phos, ALT, AST, Total. Bili, TP); Future  - UA Macroscopic with reflex to Microscopic and Culture - Lab Collect; Future    Hyperlipidemia LDL goal <130    - Lipid panel reflex to direct LDL Fasting; Future    Screening for HIV (human immunodeficiency virus)    - HIV Antigen " Antibody Combo; Future    Need for hepatitis C screening test    - Hepatitis C Screen Reflex to HCV RNA Quant and Genotype; Future    Patient has been advised of split billing requirements and indicates understanding: Yes      Counseling  Reviewed preventive health counseling, as reflected in patient instructions       Regular exercise       Healthy diet/nutrition       Vision screening       Hearing screening        He reports that he has never smoked. He has been exposed to tobacco smoke. He has never used smokeless tobacco.          Signed Electronically by: Ramiro Truong DO  Answers submitted by the patient for this visit:  Annual Preventive Visit (Submitted on 1/25/2024)  Chief Complaint: Annual Exam:  Blood in stool: No  heartburn: No  peripheral edema: No  mood changes: No  Skin sensation changes: No  impotence: No

## 2024-09-16 ENCOUNTER — MYC MEDICAL ADVICE (OUTPATIENT)
Dept: FAMILY MEDICINE | Facility: OTHER | Age: 34
End: 2024-09-16
Payer: COMMERCIAL

## 2024-09-16 ENCOUNTER — TELEPHONE (OUTPATIENT)
Dept: FAMILY MEDICINE | Facility: OTHER | Age: 34
End: 2024-09-16
Payer: COMMERCIAL

## 2024-09-16 NOTE — TELEPHONE ENCOUNTER
Left detailed message for pt to return our call.    Vas consult was scheduled incorrectly. Was scheduled virtually and with [facility]. Cannot do this appointment virtually nor does J do vasectomies.     I did schedule him for same day but with Dr. Watson for 1110am arrival in person.     Sending mychart also.

## 2024-10-02 ENCOUNTER — OFFICE VISIT (OUTPATIENT)
Dept: FAMILY MEDICINE | Facility: CLINIC | Age: 34
End: 2024-10-02
Payer: COMMERCIAL

## 2024-10-02 VITALS
HEIGHT: 71 IN | RESPIRATION RATE: 16 BRPM | SYSTOLIC BLOOD PRESSURE: 115 MMHG | DIASTOLIC BLOOD PRESSURE: 72 MMHG | TEMPERATURE: 97.7 F | OXYGEN SATURATION: 95 % | BODY MASS INDEX: 36.62 KG/M2 | WEIGHT: 261.6 LBS | HEART RATE: 64 BPM

## 2024-10-02 DIAGNOSIS — Z30.09 ENCOUNTER FOR VASECTOMY COUNSELING: Primary | ICD-10-CM

## 2024-10-02 PROCEDURE — 99213 OFFICE O/P EST LOW 20 MIN: CPT | Performed by: FAMILY MEDICINE

## 2024-10-02 ASSESSMENT — PAIN SCALES - GENERAL: PAINLEVEL: NO PAIN (0)

## 2024-10-09 ENCOUNTER — MYC MEDICAL ADVICE (OUTPATIENT)
Dept: FAMILY MEDICINE | Facility: CLINIC | Age: 34
End: 2024-10-09
Payer: COMMERCIAL

## 2024-10-09 DIAGNOSIS — Z30.2 ENCOUNTER FOR VASECTOMY: Primary | ICD-10-CM

## 2024-10-09 NOTE — TELEPHONE ENCOUNTER
November 1 from 11 to noon, have him come in at 1030.  Moved to patient that is scheduled 11:00 slot to any available slot throughout that day that would work for her.  Let me know if he needs Ativan.  Need a  for him if he wanted to try the Ativan.  Also be sure he wear tight underwear on the date of the procedure.

## 2024-10-10 NOTE — TELEPHONE ENCOUNTER
Patient is scheduled and said he would like the Ativan for the procedure.  His wife will be his .

## 2024-10-11 RX ORDER — LORAZEPAM 0.5 MG/1
TABLET ORAL
Qty: 3 TABLET | Refills: 0 | Status: SHIPPED | OUTPATIENT
Start: 2024-10-11

## 2024-11-01 ENCOUNTER — APPOINTMENT (OUTPATIENT)
Dept: FAMILY MEDICINE | Facility: CLINIC | Age: 34
End: 2024-11-01
Payer: COMMERCIAL

## 2024-11-01 ENCOUNTER — OFFICE VISIT (OUTPATIENT)
Dept: FAMILY MEDICINE | Facility: CLINIC | Age: 34
End: 2024-11-01
Payer: COMMERCIAL

## 2024-11-01 VITALS
DIASTOLIC BLOOD PRESSURE: 60 MMHG | HEART RATE: 81 BPM | WEIGHT: 259.8 LBS | RESPIRATION RATE: 14 BRPM | SYSTOLIC BLOOD PRESSURE: 110 MMHG | TEMPERATURE: 97.6 F | HEIGHT: 67 IN | OXYGEN SATURATION: 97 % | BODY MASS INDEX: 40.78 KG/M2

## 2024-11-01 DIAGNOSIS — Z30.2 ENCOUNTER FOR STERILIZATION: Primary | ICD-10-CM

## 2024-11-01 PROCEDURE — 55250 REMOVAL OF SPERM DUCT(S): CPT | Performed by: FAMILY MEDICINE

## 2024-11-01 PROCEDURE — 88302 TISSUE EXAM BY PATHOLOGIST: CPT | Performed by: PATHOLOGY

## 2024-11-01 NOTE — PROGRESS NOTES
{PROVIDER CHARTING PREFERENCE:712437}    Charo Lorenz is a 33 year old, presenting for the following health issues:  Vasectomy        11/1/2024    10:35 AM   Additional Questions   Roomed by Alka ROB     {MA/LPN/RN Pre-Provider Visit Orders- hCG/UA/Strep (Optional):847068}  {SUPERLIST (Optional):244558}  {additonal problems for provider to add (Optional):737701}    {ROS Picklists (Optional):329800}      Objective    There were no vitals taken for this visit.  There is no height or weight on file to calculate BMI.  Physical Exam   {Exam List (Optional):502416}    {Diagnostic Test Results (Optional):203431}        Signed Electronically by: Mana Bright Mai, MD  {Email feedback regarding this note to [email or web address]   :[id number]}

## 2024-11-01 NOTE — PROGRESS NOTES
"Kelechi is here for the vasectomy procedure.  Pre-procedure consultation was done recently and please see my dictation for further details. Information about vasectomy was given and he has no concern or questions about the procedure today. Again per patient, both he and his wife are ready to have this procedure done and they are for sure that they do want to have any more children in the future. He denies of URI symptoms including runny nose, nasal congestions or coughing. No abdominal pain, nausea or vomiting. No diarrhea or constipation. No problems with urination.        Problem list and histories reviewed & adjusted, as indicated.  Additional history: as documented          ROS:  Constitutional, HEENT, cardiovascular, pulmonary, gi and gu systems are negative, except as otherwise noted.      OBJECTIVE:                                                      Vitals: /60   Pulse 81   Temp 97.6  F (36.4  C) (Temporal)   Resp 14   Ht 1.692 m (5' 6.6\")   Wt 117.8 kg (259 lb 12.8 oz)   SpO2 97%   BMI 41.18 kg/m        GENERAL: healthy, alert and no distress   (male): normal male genitalia without lesions or urethral discharge, no hernia.  Testes are descended bilaterally with no testicular mass. Scrotal skin looks normal. The vas deferens were easily by palpated bilaterally.    Diagnostic Test Results:  none         ASSESSMENT/PLAN:                                                      1. Encounter for vasectomy  Patient was again reassured that vasectomy is considered a permenant and irrevirsible birth control method.  Per patient, both he and his wife are very sure that this is what they want.  No contraindication was noted. Will perform the vasectomy and please see the procedure note for further details.  Post procedure care and wound care discussed. He was educated about symptoms to need to be seen or call in.  He was again informed that he is not declared to be sterile until he has two negative sperm test " consecutively and therefore he should continue with the current form of birth control.  Sperm tests to be conducted in 4-6 weeks times 2.  Tylenol or Motrin as needed for pain. All of his questions were answered.      ICD-10-CM    1. Encounter for sterilization  Z30.2 REMOVAL OF SPERM DUCT(S) [66479]     Semen Analysis Post Vasectomy     SURGICAL PATHOLOGY EXAM              Signed Electronically by: Mana Bright Mai, MD

## 2024-11-03 NOTE — PROCEDURES
PROCEDURE: Vasectomy bilateral.     SURGEON:  RAFIQ MANSFIELD    ASSISTANT: DANYEL Lake     ANESTHESIA:  6 of 1% Lidocaine periprostatic block bilaterally    Indications:  Voluntarily sterilization.  Both patient and his wife were desirous for permanent, irreversible birth control method and requested to have the Vasectomy done today.      Consent:  The whole procdure was again discussed with patient in details.  Associated risks and side effects were also discussed, including but not limitted to pain, infection, bleeding, hematoma and rare cases he may have prolonged pain that could last up to 6 -9 months.  Also explained to him that there is a minute chance of failed vasectomy due to recanalization.  He was reminded that he is not considered to be steriled until he had at least 2 consecutive negative sperm tests.  All of his questions were answered and the consent obtained.      Time out performed and patient was identified times two.  The name of the procedure and the site of the procedure to be done was also identified.      Procedure:  He took 2 tablets of 0.5 mg Ativan before the procedure and he tolerated them well with good effect.  The whole procedure was done in a usual sterile manner.  The scrotal area was saved with the electric shaver - no skin abrasion noted.  The scrotum was then cleaned with iodine times two.  Lidocain 1% without epi was localy injected at the median raphe, about 2 cm inferior to the base of the penis and about 2 cc was used.  The vas deferens were grasped with three fingers technique and about 3 cc total of same Lidocain was injected along the vas deferens bilaterally.  He tolerated that well and has good anesthetic effect.  A dissecting scissors was used to puncture and dissect an approximate 0.5 cm incision at the median raphe where the lidocain was injected.  The left vas deferens was palpated and grasped with the three fingers technique.  The ring clamp was used to grasp  the vas deferens through the scrotal incision and the dissecting scissors was used to dissect off all the tissues surrounding the vas deferen.  The vas was identified and the testicular end was tied up with 3.0 vicryl. The prostatic end was nicked with a #15 blade and then cauterized internally; it was then tied with 3.0 vicryl.  The vas was confirmed by cannulating of the lumen. The midportion of the vas deferens between the sutures was ligated and sent to pathology.  Cauterization was also performed to the vas deferens on both sides.  Frenstration or interposition was performed on the prostatic end of the as deferens.  The vas deferens was placed back in the scrotum .  Good hemostasis and he tolerated the procedure well.      Identical procedure was done on the right vas deferen without any difficulty.  Again, good hemostasis was noted.    There were no hematomas noted at the end of the procedure. Cautery was used sparingly for minor bleeders.     He overall tolerated the procedure well.  Post procedure care initiated and its instruction was explained and discussed in details.  Emphazsized the importance of using back up methods until he gets 2 consecutive negative sperm tests.  Tylenol or Motrin as needed for pain.  Educated him about wound care and symptoms that need to be seen or call in.  He felt comfortable with the plan and all of his questions were answered.    Mana Adams MD

## 2024-11-06 LAB
PATH REPORT.COMMENTS IMP SPEC: NORMAL
PATH REPORT.COMMENTS IMP SPEC: NORMAL
PATH REPORT.FINAL DX SPEC: NORMAL
PATH REPORT.GROSS SPEC: NORMAL
PATH REPORT.MICROSCOPIC SPEC OTHER STN: NORMAL
PATH REPORT.RELEVANT HX SPEC: NORMAL
PHOTO IMAGE: NORMAL

## 2024-12-26 ENCOUNTER — PATIENT OUTREACH (OUTPATIENT)
Dept: CARE COORDINATION | Facility: CLINIC | Age: 34
End: 2024-12-26

## 2024-12-26 ENCOUNTER — LAB (OUTPATIENT)
Dept: LAB | Facility: CLINIC | Age: 34
End: 2024-12-26
Payer: COMMERCIAL

## 2024-12-26 DIAGNOSIS — Z30.2 ENCOUNTER FOR STERILIZATION: ICD-10-CM

## 2024-12-26 DIAGNOSIS — Z30.2 ENCOUNTER FOR STERILIZATION: Primary | ICD-10-CM

## 2024-12-26 LAB
SPERM MOTILE SMN QL MICRO: NORMAL
SPERM P VAS SMN QL MICRO: NORMAL

## 2025-01-09 ENCOUNTER — PATIENT OUTREACH (OUTPATIENT)
Dept: CARE COORDINATION | Facility: CLINIC | Age: 35
End: 2025-01-09
Payer: COMMERCIAL

## 2025-03-22 ENCOUNTER — HEALTH MAINTENANCE LETTER (OUTPATIENT)
Age: 35
End: 2025-03-22

## 2025-07-24 ENCOUNTER — PATIENT OUTREACH (OUTPATIENT)
Dept: CARE COORDINATION | Facility: CLINIC | Age: 35
End: 2025-07-24
Payer: COMMERCIAL